# Patient Record
Sex: FEMALE | Race: WHITE
[De-identification: names, ages, dates, MRNs, and addresses within clinical notes are randomized per-mention and may not be internally consistent; named-entity substitution may affect disease eponyms.]

---

## 2019-11-07 ENCOUNTER — HOSPITAL ENCOUNTER (OUTPATIENT)
Dept: HOSPITAL 47 - RADUSWWP | Age: 54
Discharge: HOME | End: 2019-11-07
Attending: OBSTETRICS & GYNECOLOGY
Payer: COMMERCIAL

## 2019-11-07 DIAGNOSIS — D25.9: Primary | ICD-10-CM

## 2019-11-07 PROCEDURE — 76856 US EXAM PELVIC COMPLETE: CPT

## 2019-11-07 NOTE — US
EXAMINATION TYPE: US pelvic complete

 

DATE OF EXAM: 11/7/2019

 

COMPARISON: NONE

 

CLINICAL HISTORY: N95.0 post menopausal bleeding. Pt states episode of moderate vaginal bleeding x 1 
day, denies pain, pt not on HRT's

 

TECHNIQUE:  Transabdominal (TA).  Transabdominal sonographic images of the pelvis were acquired.  

 

Date of LMP:  age 49

 

EXAM MEASUREMENTS:

 

Uterus:  13.1 x 5.2 x 6.2 cm

Endometrial Stripe: 0.6 cm

Right Ovary:  2.0 x 1.2 x 2.1 cm

Left Ovary:  2.0 x 1.3 x 1.6 cm

 

 

 

1. Uterus:  Anteverted   Heterogeneous, enlarged, 3 calcified masses within fundus 1)= 2.6 x 2.0 x 2.
1 cm  2)= 2.1 x 1.8 x 1.9 cm  3)= 1.4 x 1.4 x 1.4 cm

2. Endometrium:  Appeared wnl

3. Right Ovary:  wnl

4. Left Ovary:  wnl

5. Bilateral Adnexa:  wnl

6. Posterior cul-de-sac:  wnl

 

 

 

IMPRESSION: There are small calcified fibroids scattered throughout the uterus. Endometrium not well 
visualized presumed not thickened. Suboptimal study without transvaginal investigation.

## 2020-01-14 ENCOUNTER — HOSPITAL ENCOUNTER (OUTPATIENT)
Dept: HOSPITAL 47 - BARWHC3 | Age: 55
End: 2020-01-14
Attending: SURGERY
Payer: COMMERCIAL

## 2020-01-14 VITALS — BODY MASS INDEX: 29 KG/M2

## 2020-01-14 VITALS
RESPIRATION RATE: 16 BRPM | SYSTOLIC BLOOD PRESSURE: 135 MMHG | TEMPERATURE: 98.7 F | HEART RATE: 92 BPM | DIASTOLIC BLOOD PRESSURE: 81 MMHG

## 2020-01-14 DIAGNOSIS — E55.9: ICD-10-CM

## 2020-01-14 DIAGNOSIS — E66.01: ICD-10-CM

## 2020-01-14 DIAGNOSIS — Z79.899: ICD-10-CM

## 2020-01-14 DIAGNOSIS — K21.9: Primary | ICD-10-CM

## 2020-01-14 DIAGNOSIS — K90.89: ICD-10-CM

## 2020-01-14 DIAGNOSIS — Z98.84: ICD-10-CM

## 2020-01-14 LAB
ERYTHROCYTE [DISTWIDTH] IN BLOOD BY AUTOMATED COUNT: 4.07 M/UL (ref 3.8–5.4)
ERYTHROCYTE [DISTWIDTH] IN BLOOD: 13.7 % (ref 11.5–15.5)
HCT VFR BLD AUTO: 34.8 % (ref 34–46)
HGB BLD-MCNC: 10.9 GM/DL (ref 11.4–16)
MCH RBC QN AUTO: 26.7 PG (ref 25–35)
MCHC RBC AUTO-ENTMCNC: 31.2 G/DL (ref 31–37)
MCV RBC AUTO: 85.6 FL (ref 80–100)
PLATELET # BLD AUTO: 425 K/UL (ref 150–450)
WBC # BLD AUTO: 10.1 K/UL (ref 3.8–10.6)

## 2020-01-14 PROCEDURE — 84425 ASSAY OF VITAMIN B-1: CPT

## 2020-01-14 PROCEDURE — 99211 OFF/OP EST MAY X REQ PHY/QHP: CPT

## 2020-01-14 PROCEDURE — 80053 COMPREHEN METABOLIC PANEL: CPT

## 2020-01-14 PROCEDURE — 82746 ASSAY OF FOLIC ACID SERUM: CPT

## 2020-01-14 PROCEDURE — 82607 VITAMIN B-12: CPT

## 2020-01-14 PROCEDURE — 82306 VITAMIN D 25 HYDROXY: CPT

## 2020-01-14 PROCEDURE — 85027 COMPLETE CBC AUTOMATED: CPT

## 2020-01-14 PROCEDURE — 36415 COLL VENOUS BLD VENIPUNCTURE: CPT

## 2020-01-14 PROCEDURE — 83540 ASSAY OF IRON: CPT

## 2020-01-14 NOTE — P.BASOAP
Subjective


Progress Note Date: 01/14/20


Principal diagnosis: 





Obesity





54-year-old female had a lap band switched to a sleeve gastrectomy around 2010. 

Has done fairly well.  Weight is been in the 159 range for a while.  Lately he 

has had some upper abdominal discomfort along with acid reflux and dysphagia.  

Occasional episodes of vomiting as well.  Lately has taken some Tagamet with 

some relief.  Symptoms for the last few months.  She has not followed up here in

quite some time.  Denies melena or rectal bleeding.





Objective





- Vital Signs


Vital signs: 


                                   Vital Signs











Temp  98.7 F   01/14/20 14:58


 


Pulse  92   01/14/20 14:58


 


Resp  16   01/14/20 14:58


 


BP  135/81   01/14/20 14:58


 


Pulse Ox      








                                 Intake & Output











 01/13/20 01/14/20 01/14/20





 18:59 06:59 18:59


 


Weight   72.121 kg














- Exam








Physical exam:


General: Well-developed, well-nourished


HEENT: Normocephalic, sclerae nonicteric


Abdomen: Nontender, nondistended


Extremities: No edema


Neuro: Alert and oriented





Assessment/Plan


(1) GERD (gastroesophageal reflux disease)


Narrative/Plan: 


Check annual labs at this time.  Begin antiacid therapy.  We'll schedule for EGD

with possible biopsy and dilation.





Plan: 


Date: 01/14/20





Initial Weight: 96.162 kg





Initial BMI: 38.7





Current Weight: 72.121 kg





Current BMI: 29.0





Type of Surgery: 





Total Volume in Band: 





Previous Volume: 





Volume Removed: 





Volume Added: 





Band Size:

## 2020-01-15 LAB
ALBUMIN SERPL-MCNC: 4.5 G/DL (ref 3.8–4.9)
ALBUMIN/GLOB SERPL: 2.37 G/DL (ref 1.6–3.17)
ALP SERPL-CCNC: 90 U/L (ref 41–126)
ALT SERPL-CCNC: 14 U/L (ref 8–44)
ANION GAP SERPL CALC-SCNC: 8.1 MMOL/L (ref 4–12)
AST SERPL-CCNC: 18 U/L (ref 13–35)
BUN SERPL-SCNC: 13 MG/DL (ref 9–27)
BUN/CREAT SERPL: 16.25 RATIO (ref 12–20)
CALCIUM SPEC-MCNC: 9.8 MG/DL (ref 8.7–10.3)
CHLORIDE SERPL-SCNC: 107 MMOL/L (ref 96–109)
CO2 SERPL-SCNC: 26.9 MMOL/L (ref 21.6–31.8)
GLOBULIN SER CALC-MCNC: 1.9 G/DL (ref 1.6–3.3)
GLUCOSE SERPL-MCNC: 96 MG/DL (ref 70–110)
IRON SERPL-MCNC: 17 UG/DL (ref 50–170)
POTASSIUM SERPL-SCNC: 4.5 MMOL/L (ref 3.5–5.5)
PROT SERPL-MCNC: 6.4 G/DL (ref 6.2–8.2)
SODIUM SERPL-SCNC: 142 MMOL/L (ref 135–145)
VIT B12 SERPL-MCNC: 3199 PG/ML (ref 200–944)

## 2020-02-21 ENCOUNTER — HOSPITAL ENCOUNTER (OUTPATIENT)
Dept: HOSPITAL 47 - ORWHC2ENDO | Age: 55
Discharge: HOME | End: 2020-02-21
Attending: SURGERY
Payer: COMMERCIAL

## 2020-02-21 VITALS — SYSTOLIC BLOOD PRESSURE: 118 MMHG | HEART RATE: 74 BPM | DIASTOLIC BLOOD PRESSURE: 80 MMHG | RESPIRATION RATE: 20 BRPM

## 2020-02-21 VITALS — BODY MASS INDEX: 29.2 KG/M2

## 2020-02-21 VITALS — TEMPERATURE: 98.2 F

## 2020-02-21 DIAGNOSIS — K22.10: ICD-10-CM

## 2020-02-21 DIAGNOSIS — Z87.898: ICD-10-CM

## 2020-02-21 DIAGNOSIS — I10: ICD-10-CM

## 2020-02-21 DIAGNOSIS — Z91.018: ICD-10-CM

## 2020-02-21 DIAGNOSIS — Z79.891: ICD-10-CM

## 2020-02-21 DIAGNOSIS — F40.240: ICD-10-CM

## 2020-02-21 DIAGNOSIS — G47.33: ICD-10-CM

## 2020-02-21 DIAGNOSIS — Z80.0: ICD-10-CM

## 2020-02-21 DIAGNOSIS — Z88.6: ICD-10-CM

## 2020-02-21 DIAGNOSIS — K76.0: ICD-10-CM

## 2020-02-21 DIAGNOSIS — Z91.041: ICD-10-CM

## 2020-02-21 DIAGNOSIS — I44.7: ICD-10-CM

## 2020-02-21 DIAGNOSIS — Z98.890: ICD-10-CM

## 2020-02-21 DIAGNOSIS — Z79.84: ICD-10-CM

## 2020-02-21 DIAGNOSIS — Z96.651: ICD-10-CM

## 2020-02-21 DIAGNOSIS — Z88.8: ICD-10-CM

## 2020-02-21 DIAGNOSIS — Z87.19: ICD-10-CM

## 2020-02-21 DIAGNOSIS — K29.50: ICD-10-CM

## 2020-02-21 DIAGNOSIS — G43.909: ICD-10-CM

## 2020-02-21 DIAGNOSIS — Z82.49: ICD-10-CM

## 2020-02-21 DIAGNOSIS — Z81.8: ICD-10-CM

## 2020-02-21 DIAGNOSIS — K21.0: Primary | ICD-10-CM

## 2020-02-21 DIAGNOSIS — Z79.899: ICD-10-CM

## 2020-02-21 DIAGNOSIS — Z90.710: ICD-10-CM

## 2020-02-21 DIAGNOSIS — K31.89: ICD-10-CM

## 2020-02-21 DIAGNOSIS — Z86.73: ICD-10-CM

## 2020-02-21 DIAGNOSIS — M79.7: ICD-10-CM

## 2020-02-21 DIAGNOSIS — Z87.891: ICD-10-CM

## 2020-02-21 DIAGNOSIS — Z88.2: ICD-10-CM

## 2020-02-21 DIAGNOSIS — Z98.84: ICD-10-CM

## 2020-02-21 DIAGNOSIS — E11.9: ICD-10-CM

## 2020-02-21 DIAGNOSIS — Z98.51: ICD-10-CM

## 2020-02-21 PROCEDURE — 88305 TISSUE EXAM BY PATHOLOGIST: CPT

## 2020-02-21 PROCEDURE — 43239 EGD BIOPSY SINGLE/MULTIPLE: CPT

## 2020-02-21 NOTE — P.GSHP
History of Present Illness


H&P Date: 20


Chief Complaint: GERD





54-year-old female known drop his.  Patient with history of lap band in the 

past.  She had a conversion to a sleeve gastrectomy.  Here today for upper 

endoscopy given worsening symptoms of GERD.  Mild dysphagia at times.  Recently 

switched to omeprazole with better symptom control.





Past Medical History


Past Medical History: GERD/Reflux


Additional Past Medical History / Comment(s): hiatal hernia,


History of Any Multi-Drug Resistant Organisms: None Reported


Past Surgical History: Bariatric Surgery,  Section, Joint Replacement, 

Orthopedic Surgery, Tubal Ligation


Additional Past Surgical History / Comment(s): lap band /later removed, gastric 

sleeve,  rt knee replacement, left knee arthroscopy x 2, rt shoulder 

arthroscopy, left elbow surgery for bone chip,


Past Anesthesia/Blood Transfusion Reactions: No Reported Reaction


Additional Past Anesthesia/Blood Transfusion Reaction / Comment(s): No blood 

transfusion to date


Smoking Status: Former smoker





- Past Family History


  ** Father


Family Medical History: Cancer, Pulmonary Embolus


Additional Family Medical History / Comment(s): throat cancer





  ** Mother


Family Medical History: Dementia


Additional Family Medical History / Comment(s): 2019: patient's mother is 81 

years old.





Medications and Allergies


                                Home Medications











 Medication  Instructions  Recorded  Confirmed  Type


 


ARIPiprazole [Abilify] 5 mg PO DAILY 10/23/14 02/21/20 History


 


Cholecalciferol (Vitamin D3) 125 mcg PO DAILY 20 History





[Vitamin D3]    


 


Sertraline [Zoloft] 100 mg PO DAILY 20 History








                                    Allergies











Allergy/AdvReac Type Severity Reaction Status Date / Time


 


No Known Allergies Allergy   Verified 20 08:02














Surgical - Exam


                                   Vital Signs











Temp Pulse Resp BP Pulse Ox


 


 98.2 F   72   16   121/80   97 


 


 20 07:56  20 07:56  20 07:56  20 07:56  20 07:56

















Physical exam:


General: Well-developed, well-nourished


HEENT: Normocephalic, sclerae nonicteric


Abdomen: Nontender, nondistended


Extremities: No edema


Neuro: Alert and oriented





Assessment and Plan


(1) GERD (gastroesophageal reflux disease)


Narrative/Plan: 


Will proceed with upper endoscopy at this time


Current Visit: No   Status: Acute   Code(s): K21.9 - GASTRO-ESOPHAGEAL REFLUX 

DISEASE WITHOUT ESOPHAGITIS   SNOMED Code(s): 299183614

## 2020-02-21 NOTE — P.PCN
Date of Procedure: 02/21/20


Procedure(s) Performed: 


Preoperative Dx: GERD, post sleeve


Postoperative Dx: Mild gastritis, distal esophagitis


Procedure: EGD with Bx


Anesthesia: Sedation


Endoscopist: Dr. Felix


Specimens: Antrum, esophagitis


Endoscopic Procedure:   The patient was on the endoscopy table in the left 

decubitus position.  The Olympus gastroscope was inserted into the oropharynx 

and passed under direct visualization to the region of the third portion of the 

duodenum.  From that point the scope was slowly withdrawn inspecting all 

surfaces carefully.  There were no neoplastic inflammatory or polypoid lesions 

throughout the duodenum.  The pylorus was widely patent.  The stomach was 

carefully inspected.  There was evidence of previous sleeve gastrectomy.  Very 

subtle narrowing during the middle aspect of the sleeve was noted without any 

obstruction to the passage of the scope noted.  The proximal stomach appeared 

normal.  There was no definite hiatal hernia although retroflexion could not 

take place.  At the distal esophagus multiple short linear erosions were 

identified these were non-circumferential and less than 1 cm in length.  A 

biopsy one of these took place.  The remainder the esophagus appeared normal.   

The patient was then taken to the recovery room in stable condition per 

anesthesia guidelines.


Recommendations: Await biopsy results.  Continue antiacid therapy.  Add 

Carafate.  Follow-up bariatric center.

## 2020-06-16 ENCOUNTER — HOSPITAL ENCOUNTER (OUTPATIENT)
Dept: HOSPITAL 47 - RADXRYALE | Age: 55
Discharge: HOME | End: 2020-06-16
Attending: PHYSICIAN ASSISTANT
Payer: COMMERCIAL

## 2020-06-16 DIAGNOSIS — M54.41: Primary | ICD-10-CM

## 2020-06-16 PROCEDURE — 72110 X-RAY EXAM L-2 SPINE 4/>VWS: CPT

## 2020-06-16 NOTE — XR
EXAMINATION TYPE: XR lumbosacral spine min 4V

 

DATE OF EXAM: 6/16/2020

 

CLINICAL HISTORY: pain

 

COMPARISON: NONE

 

TECHNIQUE: Frontal, lateral, and oblique images of the lumbar spine are obtained.

 

FINDINGS:  There are 5 lumbar type vertebral bodies identified.  The lumbar spine shows satisfactory 
alignment without evidence of acute fracture or dislocation. Vertebral body heights are within normal
 limits. Moderate degenerative disc space narrowing at L4-5 and L5-S1. Grade 1 retrolisthesis of L3 a
nd L4 of 5.9 mm. Scattered ventral spondylosis.   The overlying soft tissue appears unremarkable.

 

IMPRESSION:  No acute fracture or dislocation is seen in the lumbar spine.

 

ICD 10 NO FRACTURE, INITIAL EVALUATION

## 2020-12-17 ENCOUNTER — HOSPITAL ENCOUNTER (OUTPATIENT)
Dept: HOSPITAL 47 - RADUSWWP | Age: 55
Discharge: HOME | End: 2020-12-17
Attending: OBSTETRICS & GYNECOLOGY
Payer: COMMERCIAL

## 2020-12-17 DIAGNOSIS — D25.9: Primary | ICD-10-CM

## 2020-12-17 PROCEDURE — 76856 US EXAM PELVIC COMPLETE: CPT

## 2020-12-17 NOTE — US
EXAMINATION TYPE: US pelvic complete

 

DATE OF EXAM: 2020

 

COMPARISON: Pelvic ultrasound 2019.

 

CLINICAL HISTORY: D25.9 KNOWN UTERINE. Follow up fibroids.  Patient states she does not want the lambert
svaginal exam.  

 

TECHNIQUE:  Transabdominal (TA).  Transabdominal sonographic images of the pelvis were acquired.  

 

Date of LMP:  , 

 

EXAM MEASUREMENTS:

 

Uterus:  12.9 x 6.1 x 4.6 cm

Endometrial Stripe: 0.5 cm

Right Ovary:  2.2 x 1.5 x 1.1 cm cm

 

 

 

1. Uterus:  Anteverted   Enlarged.  Heterogenous.  Focal lesions with calcifications.  1- Right margoth
l =  2.1 x 2.2 x 1.7 cm.  2- Mid anterior = 2.1 x 2.2 x 1.7 cm.  3- anterior left = 1.9 x 2.0 x 1.2 c
m.  

2. Endometrium:  wnl

3. Right Ovary:  wnl

4. Left Ovary:  Obscured by overlying bowel gas

5. Bilateral Adnexa:  wnl

6. Posterior cul-de-sac:  no free fluid

 

Persistent heterogeneous anteverted enlarged lobulated fibroid uterus some fibroids are calcified. Fe
w are noted as detailed above. Endometrium not well seen, not suspiciously thickened. No free fluid. 


 

Right ovary small in size system with patient's postmenopausal age. Left ovary not clearly identified
.

 

IMPRESSION: Prominent lobulated fibroid uterus redemonstrated. No significant interval change.

## 2021-04-23 ENCOUNTER — HOSPITAL ENCOUNTER (OUTPATIENT)
Dept: HOSPITAL 47 - RADMAMWWP | Age: 56
Discharge: HOME | End: 2021-04-23
Attending: OBSTETRICS & GYNECOLOGY
Payer: COMMERCIAL

## 2021-04-23 DIAGNOSIS — Z78.0: ICD-10-CM

## 2021-04-23 DIAGNOSIS — M85.89: ICD-10-CM

## 2021-04-23 DIAGNOSIS — Z12.31: Primary | ICD-10-CM

## 2021-04-23 PROCEDURE — 77067 SCR MAMMO BI INCL CAD: CPT

## 2021-04-23 PROCEDURE — 77080 DXA BONE DENSITY AXIAL: CPT

## 2021-04-23 NOTE — BD
EXAMINATION TYPE: Axial Bone Density

 

DATE OF EXAM: 4/23/2021

 

COMPARISON: NONE

 

CLINICAL HISTORY: 55 YR OLD FEMALE....ICD-10 CODE:   N95.1 POST MENOPAUSAL

 

Height:  61.4

Weight:  163

 

FRAX RISK QUESTIONS:

Current Tobacco Use: YES

 

RISK FACTORS 

HISTORY OF: 

Family History of Osteoporosis: YES POSSIBLY MOTHER, NO HIP FX

Diet low in dairy products/other sources of calcium:  YES

Postmenopausal woman: YES, AT AGE 49 YRS OLD

Hyperparathyroidism: NO

Adrenal Insufficiency: NO

 

MEDICATIONS: 

Additional Medications: ZOLOFT ANC ABILIFY, VIT D  

Additional History: NOTHING TO NOTE HERE

 

EXAM MEASUREMENTS: 

Bone mineral densitometry was performed using the [employer] System.

Bone mineral density as measured about the Lumbar spine is:  

----- L1-L4(G/cm2): 1.321

T Score Values are as follows:

----- L1:  1.1

----- L2:  1.8

----- L3:  1.2

----- L4:  0.6

----- L1-L4:  1.2

Bone mineral density      FIRST BONE DENSITY SCAN.......BASELINE STUDY

 

Bone mineral density about the R hip (g/cm2): 0.909

Bone mineral density about the L hip (g/cm2):  0.895

T Score values are as follows:

-----R Neck: -1.2

-----L Neck:  -1.3

-----R Total:  -0.8

-----L Total:  -0.9

Bone mineral density                 BASELINE STUDY

 

FRAX%s:   THERE IS A 6.6% CHANCE FOR A MAJOR OSTEOPOROTIC FX AND A 0.8% FOR HIP.......PROBABILITY FOR
 FX IN 10 YRS TIME

 

IMPRESSION:

Osteopenia (T Score between -2.5 and -1).

 

There is slightly increased risk of fracture and the patient may be considered 

for treatment. 

 

Re-Screen 2-5 years.

 

NOTE:  T-SCORE=SD OF THE YOUNG ADULT MEAN.

## 2021-04-26 NOTE — MM
Reason for exam: screening  (asymptomatic).

Last mammogram was performed 6 years and 10 months ago.



History:

Patient is postmenopausal.



Physical Findings:

A clinical breast exam by your physician is recommended on an annual basis and 

results should be correlated with mammographic findings.



MG Screening Mammo w CAD

Bilateral CC and MLO view(s) were taken.

Prior study comparison: June 24, 2014, bilateral MG screening mammo w CAD.

The breast tissue is heterogeneously dense. This may lower the sensitivity of 

mammography.  There are benign appearing round calcifications bilaterally. There 

is no discrete abnormality.





ASSESSMENT: Benign, BI-RAD 2



RECOMMENDATION:

Routine screening mammogram of both breasts in 1 year.

## 2021-06-09 ENCOUNTER — HOSPITAL ENCOUNTER (OUTPATIENT)
Dept: HOSPITAL 47 - LABPAT | Age: 56
Discharge: HOME | End: 2021-06-09
Attending: ORTHOPAEDIC SURGERY
Payer: COMMERCIAL

## 2021-06-09 DIAGNOSIS — Z01.812: Primary | ICD-10-CM

## 2021-06-09 DIAGNOSIS — M17.12: ICD-10-CM

## 2021-06-09 LAB
ALBUMIN SERPL-MCNC: 4.3 G/DL (ref 3.5–5)
ALP SERPL-CCNC: 85 U/L (ref 38–126)
ALT SERPL-CCNC: 19 U/L (ref 4–34)
ANION GAP SERPL CALC-SCNC: 8 MMOL/L
APTT BLD: 21.8 SEC (ref 22–30)
AST SERPL-CCNC: 31 U/L (ref 14–36)
BUN SERPL-SCNC: 11 MG/DL (ref 7–17)
CALCIUM SPEC-MCNC: 9.7 MG/DL (ref 8.4–10.2)
CHLORIDE SERPL-SCNC: 107 MMOL/L (ref 98–107)
CO2 SERPL-SCNC: 26 MMOL/L (ref 22–30)
ERYTHROCYTE [DISTWIDTH] IN BLOOD BY AUTOMATED COUNT: 4.79 M/UL (ref 3.8–5.4)
ERYTHROCYTE [DISTWIDTH] IN BLOOD: 17 % (ref 11.5–15.5)
GLUCOSE SERPL-MCNC: 88 MG/DL (ref 74–99)
HCT VFR BLD AUTO: 40.5 % (ref 34–46)
HGB BLD-MCNC: 12.8 GM/DL (ref 11.4–16)
INR PPP: 1 (ref ?–1.2)
MCH RBC QN AUTO: 26.7 PG (ref 25–35)
MCHC RBC AUTO-ENTMCNC: 31.7 G/DL (ref 31–37)
MCV RBC AUTO: 84.4 FL (ref 80–100)
PH UR: 5.5 [PH] (ref 5–8)
PLATELET # BLD AUTO: 300 K/UL (ref 150–450)
POTASSIUM SERPL-SCNC: 4.3 MMOL/L (ref 3.5–5.1)
PROT SERPL-MCNC: 6.7 G/DL (ref 6.3–8.2)
PT BLD: 10.5 SEC (ref 9–12)
RBC UR QL: <1 /HPF (ref 0–5)
SODIUM SERPL-SCNC: 141 MMOL/L (ref 137–145)
SP GR UR: 1.01 (ref 1–1.03)
SQUAMOUS UR QL AUTO: <1 /HPF (ref 0–4)
UROBILINOGEN UR QL STRIP: <2 MG/DL (ref ?–2)
WBC # BLD AUTO: 8.7 K/UL (ref 3.8–10.6)
WBC #/AREA URNS HPF: <1 /HPF (ref 0–5)

## 2021-06-09 PROCEDURE — 80053 COMPREHEN METABOLIC PANEL: CPT

## 2021-06-09 PROCEDURE — 85610 PROTHROMBIN TIME: CPT

## 2021-06-09 PROCEDURE — 87070 CULTURE OTHR SPECIMN AEROBIC: CPT

## 2021-06-09 PROCEDURE — 36415 COLL VENOUS BLD VENIPUNCTURE: CPT

## 2021-06-09 PROCEDURE — 85027 COMPLETE CBC AUTOMATED: CPT

## 2021-06-09 PROCEDURE — 81001 URINALYSIS AUTO W/SCOPE: CPT

## 2021-06-09 PROCEDURE — 85730 THROMBOPLASTIN TIME PARTIAL: CPT

## 2021-06-14 ENCOUNTER — HOSPITAL ENCOUNTER (OUTPATIENT)
Dept: HOSPITAL 47 - OR | Age: 56
LOS: 1 days | Discharge: HOME HEALTH | End: 2021-06-15
Attending: ORTHOPAEDIC SURGERY
Payer: COMMERCIAL

## 2021-06-14 VITALS — BODY MASS INDEX: 29.2 KG/M2

## 2021-06-14 DIAGNOSIS — Z79.82: ICD-10-CM

## 2021-06-14 DIAGNOSIS — F17.210: ICD-10-CM

## 2021-06-14 DIAGNOSIS — D64.9: ICD-10-CM

## 2021-06-14 DIAGNOSIS — F32.9: ICD-10-CM

## 2021-06-14 DIAGNOSIS — Z96.651: ICD-10-CM

## 2021-06-14 DIAGNOSIS — Z98.51: ICD-10-CM

## 2021-06-14 DIAGNOSIS — Z80.0: ICD-10-CM

## 2021-06-14 DIAGNOSIS — Z98.890: ICD-10-CM

## 2021-06-14 DIAGNOSIS — Z82.49: ICD-10-CM

## 2021-06-14 DIAGNOSIS — Z83.3: ICD-10-CM

## 2021-06-14 DIAGNOSIS — Z79.1: ICD-10-CM

## 2021-06-14 DIAGNOSIS — Z97.3: ICD-10-CM

## 2021-06-14 DIAGNOSIS — M17.12: Primary | ICD-10-CM

## 2021-06-14 DIAGNOSIS — Z79.899: ICD-10-CM

## 2021-06-14 DIAGNOSIS — Z81.8: ICD-10-CM

## 2021-06-14 DIAGNOSIS — K21.9: ICD-10-CM

## 2021-06-14 PROCEDURE — 80048 BASIC METABOLIC PNL TOTAL CA: CPT

## 2021-06-14 PROCEDURE — 73560 X-RAY EXAM OF KNEE 1 OR 2: CPT

## 2021-06-14 PROCEDURE — 88300 SURGICAL PATH GROSS: CPT

## 2021-06-14 PROCEDURE — 97161 PT EVAL LOW COMPLEX 20 MIN: CPT

## 2021-06-14 PROCEDURE — 64999 UNLISTED PX NERVOUS SYSTEM: CPT

## 2021-06-14 PROCEDURE — 85025 COMPLETE CBC W/AUTO DIFF WBC: CPT

## 2021-06-14 PROCEDURE — 27447 TOTAL KNEE ARTHROPLASTY: CPT

## 2021-06-14 PROCEDURE — 76942 ECHO GUIDE FOR BIOPSY: CPT

## 2021-06-14 PROCEDURE — 64448 NJX AA&/STRD FEM NRV NFS IMG: CPT

## 2021-06-14 RX ADMIN — HYDROMORPHONE HYDROCHLORIDE PRN MG: 1 INJECTION, SOLUTION INTRAMUSCULAR; INTRAVENOUS; SUBCUTANEOUS at 16:30

## 2021-06-14 RX ADMIN — POTASSIUM CHLORIDE SCH MLS: 14.9 INJECTION, SOLUTION INTRAVENOUS at 12:35

## 2021-06-14 RX ADMIN — ASPIRIN 81 MG CHEWABLE TABLET SCH MG: 81 TABLET CHEWABLE at 20:12

## 2021-06-14 RX ADMIN — POTASSIUM CHLORIDE SCH MLS: 14.9 INJECTION, SOLUTION INTRAVENOUS at 11:30

## 2021-06-14 RX ADMIN — HYDROCODONE BITARTRATE AND ACETAMINOPHEN PRN EACH: 7.5; 325 TABLET ORAL at 20:12

## 2021-06-14 RX ADMIN — SUCRALFATE SCH: 1 TABLET ORAL at 20:06

## 2021-06-14 RX ADMIN — DEXAMETHASONE SODIUM PHOSPHATE ONE MG: 4 INJECTION, SOLUTION INTRAMUSCULAR; INTRAVENOUS at 11:54

## 2021-06-14 RX ADMIN — HYDROMORPHONE HYDROCHLORIDE PRN MG: 1 INJECTION, SOLUTION INTRAMUSCULAR; INTRAVENOUS; SUBCUTANEOUS at 15:06

## 2021-06-14 RX ADMIN — DEXAMETHASONE SODIUM PHOSPHATE ONE: 4 INJECTION, SOLUTION INTRAMUSCULAR; INTRAVENOUS at 17:56

## 2021-06-14 RX ADMIN — POTASSIUM CHLORIDE SCH: 14.9 INJECTION, SOLUTION INTRAVENOUS at 17:56

## 2021-06-14 NOTE — XR
EXAMINATION TYPE: XR knee limited LT

 

DATE OF EXAM: 6/14/2021

 

CLINICAL HISTORY: Left knee pain and arthritis status post total knee replacement.

 

TECHNIQUE:  Portable AP and crosstable lateral views of the left knee are obtained immediately postop
eratively.

 

COMPARISON: None

 

FINDINGS:  Metallic hardware from total left knee arthroplasty is seen and appears satisfactory in al
ignment and position.  There is evidence of recent surgery with diffuse subcutaneous gas and joint ef
fusion noted. 

 

 

IMPRESSION:  METALLIC HARDWARE FROM TOTAL left KNEE ARTHROPLASTY IS SATISFACTORY IN ALIGNMENT.

## 2021-06-14 NOTE — P.CON
Consult Note





- .


Consult date: 21


Assessment/Plan:: 





Reason for consult- management of chronic medical conditions





Ms. Lawrence is a 56-year-old female with a past medical history of GERD, 

osteoarthritis coming in for left knee arthroplasty.  Patient had the procedure 

done this afternoon by Dr. Mcneal.  She is lying comfortably in the bed appears 

to be no acute distress.  Patient states that she has GERD and she takes 

sucralfate on a regular basis.  Patient denies having any abdominal pain nausea 

vomiting or diarrhea.  She is an active smoker.  She denies having any fevers 

chills or rigors.  No cough or difficulty in breathing.  No lower extremity 

swelling.





 Review of Systems 





CONSTITUTIONAL: No fever, no malaise, no fatigue. 


HEENT:  History of vocal cord paralysis status post cervical surgery


CARDIOVASCULAR: No chest pain or palpitations  


PULMONARY: No shortness of breath, no cough, no hemoptysis. 


GASTROINTESTINAL: No abdominal pain nausea vomiting or diarrhea 


NEUROLOGICAL: No headaches, no weakness, no numbness. 


HEMATOLOGICAL: Denies any bleeding or petechiae. 


GENITOURINARY: Denies any burning micturition, frequency, or urgency. 


MUSCULOSKELETAL/RHEUMATOLOGICAL: Denies any joint pain, swelling, or any muscle 

pain. 


ENDOCRINE: Denies any polyuria or polydipsia.


PSYC: No depression or anxiety





All 13 review of systems negative except for the ones mentioned above





 Past Medical History 


Past Medical History: GERD/Reflux, Osteoarthritis (OA)


Additional Past Medical History / Comment(s): hiatal hernia,


History of Any Multi-Drug Resistant Organisms: None Reported


Past Surgical History: Bariatric Surgery,  Section, Hernia Repair, Joint

Replacement, Orthopedic Surgery, Tubal Ligation


Additional Past Surgical History / Comment(s): lap band /later removed, gastric 

sleeve,  rt knee replacement, left knee arthroscopy x 2, rt shoulder 

arthroscopy, left elbow surgery for bone chip, HIATAL HERNIA REPAIR


Past Anesthesia/Blood Transfusion Reactions: No Reported Reaction


Additional Past Anesthesia/Blood Transfusion Reaction / Comment(s): No blood 

transfusion to date


Past Psychological History: Depression


Additional Psychological History / Comment(s): .


Smoking Status: Former smoker


Past Alcohol Use History: Occasional


Additional Past Alcohol Use History / Comment(s): smokes half a pack


Past Drug Use History: None Reported





- Past Family History


  ** Father


Family Medical History: Cancer, Pulmonary Embolus


Additional Family Medical History / Comment(s): throat cancer





  ** Mother


Family Medical History: Dementia


Additional Family Medical History / Comment(s): 2019: patient's mother is 81 

years old.





 Medications and Allergies 


                                Home Medications











 Medication  Instructions  Recorded  Confirmed  Type


 


ARIPiprazole [Abilify] 5 mg PO DAILY 10/23/14 06/14/21 History


 


Cholecalciferol (Vitamin D3) 125 mcg PO DAILY 20 History





[Vitamin D3]    


 


Sertraline [Zoloft] 100 mg PO DAILY 20 History


 


Sucralfate [Carafate] 1 gm PO ACHS #120 tab 20 Rx


 


Aspirin 81 mg PO BID #60 tab 21  Rx


 


Gabapentin [Neurontin] 300 mg PO HS #5 cap 21  Rx


 


Hydrocodone/Acetaminophen [Norco 1 - 2 tab PO Q4-6H PRN #40 tab 21  Rx





7.5-325]    


 


Meloxicam [Mobic] 7.5 mg PO DAILY #60 tab 21  Rx


 


Ondansetron Odt [Zofran Odt] 4 mg PO Q12HR PRN #10 tab 21  Rx


 


Sennosides-Docusate Sodium 1 tab PO BID #30 tablet 21  Rx





[Senokot-S]    








                                    Allergies











Allergy/AdvReac Type Severity Reaction Status Date / Time


 


No Known Allergies Allergy   Verified 21 11:18














 Physical Exam 


Vitals: 


                                   Vital Signs











  Temp Pulse Pulse Resp BP Pulse Ox


 


 21 17:34  98.3 F  97   18  149/83  94 L


 


 21 16:53    89  16  141/78  92 L


 


 21 16:30    89  14  135/69  95


 


 21 16:00    102 H  16  142/75  92 L


 


 21 15:45    107 H  16  160/76  91 L


 


 21 15:27    106 H  14  160/68  92 L


 


 21 15:12    107 H  16  170/87  92 L


 


 21 15:00    104 H   178/85  94 L


 


 21 14:45    110 H  14  169/80  92 L


 


 21 14:31  97.8 F   112 H  12  169/80  94 L


 


 21 11:07  98.2 F   83  16  155/80  98








                                Intake and Output











 21





 06:59 14:59 22:59


 


Intake Total  1751 300


 


Output Total  100 


 


Balance  1651 300


 


Intake:   


 


  IV  1751 300


 


Output:   


 


  Estimated Blood Loss  100 


 


Other:   


 


  Weight  72.9 kg 72.9 kg





GENERAL: The patient is alert and oriented x3, not in any acute distress.


HEENT: Pupils are round and equally reacting to light. EOMI. No scleral icterus.

no  conjunctival pallor. Normocephalic, atraumatic. 


CARDIOVASCULAR: S1 and S2 present. No murmurs, rubs, or gallops. 


PULMONARY: Chest is clear to auscultation, no wheezing or crackles. 


ABDOMEN: Soft, nontender, nondistended.  Bowel sounds positive


MUSCULOSKELETAL: No joint swelling or deformity. 


EXTREMITIES: No cyanosis, clubbing, or pedal edema.  Left knee covered in 

bandage


NEUROLOGICAL: Gross neurological examination did not reveal any focal deficits.


Psychiatric: Appropriate mood and affect  








ASSESSMENT





Status post left knee arthroplasty


GERD


Active smoker


Multiple joint osteoarthritis





PLAN: Patient is advised to do incentive spirometry.  Continue with sucralfate 

for GERD, to avoid NSAIDs.  Pain management and DVT prophylaxis as per primary 

team.  We will follow the patient on as-needed basis.





Thank you for the consultation.

## 2021-06-14 NOTE — P.ANPRN
Procedure Note - Anesthesia





- Nerve Block Performed


  ** Left Adductor Canal Infusion


Time Out Performed: Yes (1151)


Date of Procedure: 06/14/21


Procedure Start Time: 11:51


Procedure Stop Time: 11:59


Location of Patient: PreOp


Indication: Acute Post-Operative Pain, Dx/Pain Location (Left Knee), Requested 

by Surgeon


Specifically requested for management of pain by DrTod: Jorge Luis Mcneal


Sedation Type: Sedate with meaningful contact maintained


Preparation: Sterile Prep, Sterile Dressing


Position: Supine


Catheter: Indwelling


Needle Types: On-Q


Needle Gauge: 18


Ultrasound used to visualize needle placement: Yes


Ultrasound used to observe medication spread: Yes


Injectate: 0.5% Ropivacaine (see comment for volume) (15 cc)


Blood Aspirated: No


Pain Paresthesia on Injection Noted: No


Resistance on Injection: Normal


Events: Uneventful and Well Tolerated





  ** iPack


Time Out Performed: Yes


Date of Procedure: 06/14/21


Procedure Start Time: 12:00


Procedure Stop Time: 12:05


Location of Patient: PreOp


Indication: Dx/Pain Location (Left knee), Requested by Surgeon


Specifically requested for management of pain by DrTod: Jorge Luis Mcneal


Sedation Type: Sedate with meaningful contact maintained


Preparation: Sterile Prep


Position: Right Lateral


Catheter: None


Needle Types: Pajunk


Needle Gauge: 21


Ultrasound used to visualize needle placement: Yes


Ultrasound used to observe medication spread: Yes


Injectate: 0.5% Ropivacaine (see comment for volume) (15 cc)


Blood Aspirated: No


Pain Paresthesia on Injection Noted: No


Resistance on Injection: Normal


Image Stored and Saved: Yes

## 2021-06-14 NOTE — P.OP
Date of Procedure: 06/14/21


Procedure(s) Performed: 


PREOPERATIVE DIAGNOSIS: Left knee severe osteoarthritis with genu varum





POSTOPERATIVE DIAGNOSIS: Left knee severe osteoarthritis with genu varum





OPERATION: Left knee cemented total replacement arthroplasty.


 


ANESTHESIA: Spinal





ESTIMATED BLOOD LOSS: 100 ml.





ASSISTANT: Breonna Aldana NP (assistance with: patient positioning, retraction, 

exposure, hemostasis, leg positioning, implantation, irrigation, closure, 

dressing)





COMPLICATIONS: None apparent. 





COMPONENTS IMPLANTED: Persona system from [employer]





INDICATIONS:  Mala is a 56 year old female with a history of left knee 

osteoarthritis.  Conservative treatment has been tried and has been unsuccessful

in controlling symptoms adequately.  The operation of knee replacement has been 

discussed at length in the office, as well as potential risks and complications.

 These are inclusive of, but not limited to: bleeding, infection, scarring, 

discomfort, blood vessel and nerve damage, need for further surgery, failure to 

relieve symptoms, persistence, recurrence, or worsening of problems, loosening, 

dislocation, wear, blood clot, pulmonary embolism, death, gait dysfunction, 

stiffness, and other risks as discussed in the office.  The patient elects to 

proceed and the consent form has been signed. 





PROCEDURE: The patient was taken to the operating room and positioned on the 

operating room table in the supine position.  Anesthesia was initiated.  Care 

was taken to make sure that all pressure points were adequately padded. The 

operative lower extremity was prepped and draped in the usual aseptic fashion 

using ChloraPrep.  Ioban drape was used for the case and the patient received 

intravenous antibiotics within one hour of the incision. A pneumotourniquet and 

leg laureano were used for the case. The limb was exsanguinated with an Esmarch 

bandage and the tourniquet was inflated to 300 mmHg. Time-out was called 

confirming the patient's identity, side, procedure and administration of 

antibiotics and tranexamic acid, 1 g IV.





 The incision was then created midline directly over the knee, carried down 

through skin and into the subcutaneous tissues and down to fascia. Full 

thickness subcutaneous medial flap was developed.  Medial parapatellar 

arthrotomy was performed and the interior of the knee was inspected. There was 

end-stage osteoarthritis of the knee with a mild to moderate genu varum type 

deformity. The fat pad was excised and proximal medial release on the tibia was 

completed using meticulous dissection and a curved osteotome. The anterior 

cruciate ligament was taken down.  Note was made of significant attrition of the

anterior and significant degenerative appearance of the posterior cruciate 

ligaments. The exposure was excellent. 





The knee was flexed 90 degrees and the patella was everted. A spot was chosen on

the femur approximately 1 cm anterior to the posterior cruciate ligament 

insertion and an intramedullary hole was created within the femur. The 

intramedullary guide was then set to 5 degrees of valgus. The distal cutting 

block was attached and pinned into position. An appropriate amount of distal 

femoral resection was set. The oscillating saw was then used to make the distal 

femoral cut. This cut was confirmed to be flat with the flat end of an 

osteotome. 





The retractors were placed around the tibia and the tibial surface was 

addressed. The angle and depth of resection was adjusted using an extramedullary

cutting guide. The guide had a built-in 3 degree posterior slope cut.  Once the 

cutting guide was adjusted appropriately and in line with the axis of the tibia 

and confirmed to be in good position in relation to the second metatarsal and 

transmalleolar axis, the tibial cut was then created with protection of the 

posterior neurovascular structures and the collateral ligaments. The tibial cut 

surface was removed and sized.





Femoral sizing was then accomplished using anterior  referencing.  Care was 

taken to analyze the posterior condyles for signs of deficiency or severe wear, 

and adjustments to the guide were made, as appropriate.   3 degree external r

otation pins were placed.  The cutting jig for the femur was applied to these 

pins.  The planned cuts were further analyzed prior to performing them with the 

oscillating saw. No femoral notching was produced. Bone fragments were removed 

and the cut surfaces were finished, as necessary, with a reciprocating saw.





Spacer block technique was then used to confirm that the flexion and extension 

gaps were equal.  Soft tissue releases and adjustment of the tibial and/or 

femoral cuts were made, as necessary, until the gaps were equal. This included 

release of the posterior cruciate ligament, which was tight in this patient.  

The femur was then further finished for a posterior cruciate ligament 

substituting component.

## 2021-06-15 VITALS — HEART RATE: 76 BPM | SYSTOLIC BLOOD PRESSURE: 125 MMHG | DIASTOLIC BLOOD PRESSURE: 80 MMHG | RESPIRATION RATE: 16 BRPM

## 2021-06-15 VITALS — TEMPERATURE: 98.2 F

## 2021-06-15 LAB
ANION GAP SERPL CALC-SCNC: 8.2 MMOL/L (ref 4–12)
BASOPHILS # BLD AUTO: 0.03 X 10*3/UL (ref 0–0.1)
BASOPHILS NFR BLD AUTO: 0.3 %
BUN SERPL-SCNC: 13 MG/DL (ref 9–27)
BUN/CREAT SERPL: 18.57 RATIO (ref 12–20)
CALCIUM SPEC-MCNC: 8.7 MG/DL (ref 8.7–10.3)
CHLORIDE SERPL-SCNC: 107 MMOL/L (ref 96–109)
CO2 SERPL-SCNC: 25.8 MMOL/L (ref 21.6–31.8)
EOSINOPHIL # BLD AUTO: 0.03 X 10*3/UL (ref 0.04–0.35)
EOSINOPHIL NFR BLD AUTO: 0.3 %
ERYTHROCYTE [DISTWIDTH] IN BLOOD BY AUTOMATED COUNT: 3.96 X 10*6/UL (ref 4.1–5.2)
ERYTHROCYTE [DISTWIDTH] IN BLOOD: 17.3 % (ref 11.5–14.5)
GLUCOSE SERPL-MCNC: 122 MG/DL (ref 70–110)
HCT VFR BLD AUTO: 34.3 % (ref 37.2–46.3)
HGB BLD-MCNC: 10.9 G/DL (ref 12–15)
LYMPHOCYTES # SPEC AUTO: 2.18 X 10*3/UL (ref 0.9–5)
LYMPHOCYTES NFR SPEC AUTO: 18.6 %
MCH RBC QN AUTO: 27.5 PG (ref 27–32)
MCHC RBC AUTO-ENTMCNC: 31.8 G/DL (ref 32–37)
MCV RBC AUTO: 86.6 FL (ref 80–97)
MONOCYTES # BLD AUTO: 0.77 X 10*3/UL (ref 0.2–1)
MONOCYTES NFR BLD AUTO: 6.6 %
NEUTROPHILS # BLD AUTO: 8.69 X 10*3/UL (ref 1.8–7.7)
NEUTROPHILS NFR BLD AUTO: 73.8 %
PLATELET # BLD AUTO: 254 X 10*3/UL (ref 140–440)
POTASSIUM SERPL-SCNC: 4.3 MMOL/L (ref 3.5–5.5)
SODIUM SERPL-SCNC: 141 MMOL/L (ref 135–145)
WBC # BLD AUTO: 11.75 X 10*3/UL (ref 4.5–10)

## 2021-06-15 RX ADMIN — ASPIRIN 81 MG CHEWABLE TABLET SCH MG: 81 TABLET CHEWABLE at 08:04

## 2021-06-15 RX ADMIN — SUCRALFATE SCH GM: 1 TABLET ORAL at 08:04

## 2021-06-15 RX ADMIN — POTASSIUM CHLORIDE SCH: 14.9 INJECTION, SOLUTION INTRAVENOUS at 07:58

## 2021-06-15 RX ADMIN — HYDROCODONE BITARTRATE AND ACETAMINOPHEN PRN EACH: 7.5; 325 TABLET ORAL at 08:05

## 2021-06-15 RX ADMIN — HYDROCODONE BITARTRATE AND ACETAMINOPHEN PRN EACH: 7.5; 325 TABLET ORAL at 01:56

## 2021-06-15 RX ADMIN — POTASSIUM CHLORIDE SCH MLS/HR: 14.9 INJECTION, SOLUTION INTRAVENOUS at 03:16

## 2021-06-15 NOTE — P.DS
Providers


Expected date of discharge: 06/15/21


Attending physician: 


Jorge Luis Mcneal





Consults: 





                                        





06/14/21 18:08


Consult Physician Routine 


   Consulting Provider: Renetta Cerrato


   Consult Reason/Comments: MEDICAL MANAGEMENT


   Do you want consulting provider notified?: Yes











Primary care physician: 


Jarred Amaya








- Discharge Diagnosis(es)


(1) Primary osteoarthritis of left knee


Current Visit: Yes   Status: Acute   





(2) Status post left knee replacement


Current Visit: Yes   Status: Acute   


Hospital Course: 





This is a pleasant 56-year-old female last seen in our office with complaints of

left knee pain.  Patient has known history of degenerative arthritis of the left

knee and presented to discuss options.  After discussion and consideration, the 

patient elected to proceed with a left total knee arthroplasty.  Patient was 

seen preoperatively, and medically cleared for surgery by her primary care 

physician. 





Patient was admitted to [facility] underwent left total knee 

arthroplasty on 6/14/2021 with Dr. Mcneal.  The procedure was performed without 

complications or sequelae.  The patient is seen and evaluated at bedside today. 

Pain is well-controlled.  Patient has no new complaints today and denies any 

fevers, chills, nausea, vomiting, or shortness of breath.  Vital signs are 

stable.  Dressing is clean dry and intact.  Incision looks fine with no erythema

or active drainage.  Calf is soft and nontender.  Patient has full foot and 

ankle motion without difficulty.  Patient's left lower extremity is 

neurovascularly intact.  





The patient is orthopedically stable for discharge today.


Patient Condition at Discharge: Stable





Plan - Discharge Summary


Discharge Rx Participant: Yes


New Discharge Prescriptions: 


New


   Aspirin 81 mg PO BID #60 tab


   Sennosides-Docusate Sodium [Senokot-S] 1 tab PO BID #30 tablet


   Meloxicam [Mobic] 7.5 mg PO DAILY #60 tab


   Gabapentin [Neurontin] 300 mg PO HS #5 cap


   Ondansetron Odt [Zofran Odt] 4 mg PO Q12HR PRN #10 tab


     PRN Reason: Nausea


   Hydrocodone/Acetaminophen [Norco 7.5-325] 1 - 2 tab PO Q4-6H PRN #40 tab


     PRN Reason: Pain





No Action


   ARIPiprazole [Abilify] 5 mg PO DAILY


   Sertraline [Zoloft] 100 mg PO DAILY


   Cholecalciferol (Vitamin D3) [Vitamin D3] 125 mcg PO DAILY


   Sucralfate [Carafate] 1 gm PO ACHS #120 tab


Discharge Medication List





ARIPiprazole [Abilify] 5 mg PO DAILY 10/23/14 [History]


Cholecalciferol (Vitamin D3) [Vitamin D3] 125 mcg PO DAILY 02/18/20 [History]


Sertraline [Zoloft] 100 mg PO DAILY 02/18/20 [History]


Sucralfate [Carafate] 1 gm PO ACHS #120 tab 02/21/20 [Rx]


Aspirin 81 mg PO BID #60 tab 06/14/21 [Rx]


Gabapentin [Neurontin] 300 mg PO HS #5 cap 06/14/21 [Rx]


Hydrocodone/Acetaminophen [Norco 7.5-325] 1 - 2 tab PO Q4-6H PRN #40 tab 

06/14/21 [Rx]


Meloxicam [Mobic] 7.5 mg PO DAILY #60 tab 06/14/21 [Rx]


Ondansetron Odt [Zofran Odt] 4 mg PO Q12HR PRN #10 tab 06/14/21 [Rx]


Sennosides-Docusate Sodium [Senokot-S] 1 tab PO BID #30 tablet 06/14/21 [Rx]








Follow up Appointment(s)/Referral(s): 


Jorge Luis Mcneal MD [STAFF PHYSICIAN] - 2 Weeks


Activity/Diet/Wound Care/Special Instructions: 


Weightbearing as tolerated with walker


Keep dressing in place for 10 days unless saturated


Aspirin 81 mg twice a day


May shower over dressing


Follow up with  or Hallie Miller PA-C in 2 weeks.





Call [facility] with questions or concerns, [phone]


Discharge Disposition: HOME WITH HOME HEALTH SERVICES

## 2021-06-15 NOTE — P.PN
Progress Note - Text


Patient was seen at 6:36 AM in the morning.


Postoperative day # 1 status post total knee arthroplasty, on adductor canal 

perineural catheter placed for postoperative analgesia.


Ropivacaine 0.2%  8 mL per hour through ON-Q pump continuous infusion.


Pain is well controlled.  On visual analog scale 2/10


Patient is taking PRN oral pain medications. 


Catheter site: Looks Ok. There is no erythema or tenderness.


Continue with the current pain management plan and will follow.

## 2023-11-28 ENCOUNTER — HOSPITAL ENCOUNTER (OUTPATIENT)
Dept: HOSPITAL 47 - WWCWWP | Age: 58
End: 2023-11-28
Attending: OBSTETRICS & GYNECOLOGY
Payer: COMMERCIAL

## 2023-11-28 VITALS
TEMPERATURE: 98 F | HEART RATE: 79 BPM | SYSTOLIC BLOOD PRESSURE: 123 MMHG | RESPIRATION RATE: 16 BRPM | DIASTOLIC BLOOD PRESSURE: 79 MMHG

## 2023-11-28 DIAGNOSIS — K21.9: ICD-10-CM

## 2023-11-28 DIAGNOSIS — F17.210: ICD-10-CM

## 2023-11-28 DIAGNOSIS — F32.A: ICD-10-CM

## 2023-11-28 DIAGNOSIS — D25.9: ICD-10-CM

## 2023-11-28 DIAGNOSIS — Z78.0: ICD-10-CM

## 2023-11-28 DIAGNOSIS — Z86.018: ICD-10-CM

## 2023-11-28 DIAGNOSIS — Z79.84: ICD-10-CM

## 2023-11-28 DIAGNOSIS — E11.9: ICD-10-CM

## 2023-11-28 DIAGNOSIS — Z96.653: ICD-10-CM

## 2023-11-28 DIAGNOSIS — Z12.31: Primary | ICD-10-CM

## 2023-11-28 DIAGNOSIS — F41.9: ICD-10-CM

## 2023-11-28 DIAGNOSIS — N94.10: ICD-10-CM

## 2023-11-28 DIAGNOSIS — M85.80: ICD-10-CM

## 2023-11-28 PROCEDURE — 77067 SCR MAMMO BI INCL CAD: CPT

## 2023-11-28 NOTE — P.HPOB
History of Present Illness


H&P Date: 23


Chief Complaint: The patient is here for her routine gynecologic exam and ma

mmogram.





This is a 58-year-old  with an LMP of .  The patient is here to 

establish with this office.  She states it has been 4-5 years since her last 

pelvic exam.  She is complaining of painful intercourse even with lubrication 

for the past 1 year.  It is uncomfortable with all penetration and not just with

deep penetration.  It can get slightly better later in the act of intercourse.  

She is otherwise without gynecologic complaints and denies any postmenopausal 

bleeding.





Review of Systems





The patient has lost 15 pounds over the last year.  The weight loss has been 

intentional.  She denies respiratory, cardiac, or G.I. problems.





Past Medical History


Past Medical History: Diabetes Mellitus, GERD/Reflux


Additional Past Medical History / Comment(s): hiatal hernia, osteopenia. PAST 

GYN HISTORY: She has no history of STDs.  History of uterine fibroids.


History of Any Multi-Drug Resistant Organisms: None Reported


Past Surgical History: Bariatric Surgery,  Section, Joint Replacement, 

Orthopedic Surgery, Tubal Ligation


Additional Past Surgical History / Comment(s): lap band /later removed, gastric 

sleeve, bilateral knee replacement, left knee arthroscopy x 2, rt shoulder 

arthroscopy, left elbow surgery for bone chip,  section 3.


Past Anesthesia/Blood Transfusion Reactions: No Reported Reaction


Additional Past Anesthesia/Blood Transfusion Reaction / Comment(s): No blood 

transfusion to date


Past Psychological History: Anxiety, Depression (Under control with 

medications.)


Additional Psychological History / Comment(s): .


Smoking Status: Current every day smoker (About a half a pack of cigars per 

day.)


Past Alcohol Use History: Occasional (2 drinks per week.)


Additional Past Alcohol Use History / Comment(s): smokes half a pack


Past Drug Use History: None Reported


Additional History: She has been  since  and is sexually active.  She

is a  for [employer].





- Past Family History


  ** Father


Family Medical History: Cancer, Diabetes Mellitus, Myocardial Infarction (MI), 

Pulmonary Embolus


Additional Family Medical History / Comment(s): throat cancer.  .





  ** Mother


Family Medical History: Dementia


Additional Family Medical History / Comment(s): .  No family history of 

cancer of the breast, uterus, ovaries, or colon.





Medications and Allergies


                                Home Medications











 Medication  Instructions  Recorded  Confirmed  Type


 


ARIPiprazole [Abilify] 5 mg PO DAILY 10/23/14 11/28/23 History


 


Cholecalciferol (Vitamin D3) 125 mcg PO DAILY 20 History





[Vitamin D3 (5000 Iu)]    


 


Sertraline [Zoloft] 100 mg PO DAILY 20 History


 


metFORMIN  mg PO DAILY 23 History








                                    Allergies











Allergy/AdvReac Type Severity Reaction Status Date / Time


 


No Known Allergies Allergy   Verified 23 10:37














Exam


                                   Vital Signs











  Temp Pulse Resp BP Pulse Ox


 


 23 10:41  98 F  79  16  123/79  99








                                Intake and Output











 23





 22:59 06:59 14:59


 


Other:   


 


  Weight   72.575 kg














Height 5 feet 2 inches, weight 160 pounds, BMI 29.3.





This is a well-developed well-nourished white female who is alert and oriented 

times 3 in no acute distress.


HEENT: Within normal limits.


NECK: Supple without mass or thyromegaly.


CHEST AND LUNGS: Clear to auscultation.


HEART: Regular rate and rhythm.


BREASTS: Are without mass or discharge.


AXILLARY EXAM: Negative for adenopathy.


BACK: Negative for CVA tenderness.


ABDOMEN: Soft, nontender, without palpable masses.


PELVIC EXAM: Normal external genitalia with mild atrophy. Cervix and vagina 

appear normal with mild atrophy.  The vagina is very long and narrow. There is 

no unusual discharge.  There is no evidence of prolapse.  The uterus is 

midposition, nongravid size and nontender. There are no palpable adnexal masses 

or tenderness.


RECTAL EXAM: Rectovaginal exam is negative for mass or tenderness and is 

negative for occult blood.


EXTREMITIES: Nontender.





Pelvic ultrasound was done on 2020 and this showed multiple small uterine 

fibroids with the largest measuring 2.2 cm.





IMPRESSION: 


1.  58-year-old menopausal female with normal gynecologic exam.


2.  Dyspareunia probably secondary to genital atrophy with a small vaginal 

dimensions secondary to no vaginal births.


3.  History of uterine fibroids which are asymptomatic.


4.  History of osteopenia.





PLAN: 


1.  Pap smear cotest was performed.


2.  Self breast awareness was discussed with the patient.  We have also 

discussed symptoms associated with inflammatory breast cancer.


3.  Screening mammogram will be done today.


4.  Osteoporosis prevention was discussed.  I have stressed the importance of 

adequate calcium, vitamin D and regular exercise.  Recommended amounts of 

calcium and vitamin D were also discussed.  Her last bone density test was done 

on 2021.  I recommended repeating this next year.


5.  Trial of Premarin vaginal cream.  She is to insert 1 g into the vagina 2 

times weekly.  A sample tube was given to the patient with instructions.  The 

electronic prescription will be sent to [facility] in [place].


6.  She is scheduled for a colonoscopy in 2024.  She will arrange these 

things through her PCP.


7. She was advised to return in one year for her annual well woman exam and as 

needed.

## 2023-11-29 NOTE — MM
Reason for Exam: Screening  (asymptomatic). 

Last mammogram was performed 2 year(s) and 7 month(s) ago. 





Patient History: 

Menarche at age 10. First Full-Term Pregnancy at age 25. Postmenopausal. 





Risk Values: 

Julieta 5 year model risk: 1.7%.

NCI Lifetime model risk: 9.1%.





Prior Study Comparison: 

4/25/2007 Bilateral Screening Mammogram, [facility]. 6/24/2014 Bilateral Screening Mammogram, [facility].

4/23/2021 Bilateral Screening Mammogram, [facility]. 





Tissue Density: 

There are scattered fibroglandular densities.





Findings: 

Analyzed By CAD. 

There is no suspicious group of microcalcifications or new suspicious mass. 





Overall Assessment: Negative, BI-RAD 1





Management: 

Screening Mammogram of both breasts in 1 year.

[facility] will attempt to contact patient to return for supplemental views and

ultrasound if indicated.



Patient should continue monthly self-breast exams.  A clinical breast exam by your physician is

recommended on an annual basis.

This exam should not preclude additional follow-up of suspicious palpable abnormalities.



Note on Julieta scores and lifetime risk:

1. A Julieta score greater than 3% is considered moderate risk. If this is the case, consider

specialist referral to assess eligibility for a risk reducing agent.

2. If overall lifetime risk for the development of breast cancer is 20% or higher, the patient may

qualify for future screening with alternating mammogram and breast MRI.



Electronically signed and approved by: Javad Mazariegos DO

## 2024-01-23 ENCOUNTER — HOSPITAL ENCOUNTER (OUTPATIENT)
Dept: HOSPITAL 47 - ORWHC2ENDO | Age: 59
Discharge: HOME | End: 2024-01-23
Attending: SURGERY
Payer: COMMERCIAL

## 2024-01-23 VITALS — TEMPERATURE: 97.5 F

## 2024-01-23 VITALS — BODY MASS INDEX: 29.2 KG/M2

## 2024-01-23 VITALS — DIASTOLIC BLOOD PRESSURE: 85 MMHG | HEART RATE: 75 BPM | SYSTOLIC BLOOD PRESSURE: 144 MMHG

## 2024-01-23 VITALS — RESPIRATION RATE: 16 BRPM

## 2024-01-23 DIAGNOSIS — F17.200: ICD-10-CM

## 2024-01-23 DIAGNOSIS — M19.90: ICD-10-CM

## 2024-01-23 DIAGNOSIS — Z79.899: ICD-10-CM

## 2024-01-23 DIAGNOSIS — E11.9: ICD-10-CM

## 2024-01-23 DIAGNOSIS — K57.30: ICD-10-CM

## 2024-01-23 DIAGNOSIS — Z12.11: Primary | ICD-10-CM

## 2024-01-23 DIAGNOSIS — Z79.84: ICD-10-CM

## 2024-01-23 DIAGNOSIS — K21.9: ICD-10-CM

## 2024-01-23 DIAGNOSIS — M85.80: ICD-10-CM

## 2024-01-23 LAB
GLUCOSE BLD-MCNC: 104 MG/DL (ref 70–110)
GLUCOSE BLD-MCNC: 131 MG/DL (ref 70–110)

## 2024-01-23 PROCEDURE — 45378 DIAGNOSTIC COLONOSCOPY: CPT

## 2024-01-23 NOTE — P.PCN
Date of Procedure: 01/23/24


Procedure(s) Performed: 


PREOPERATIVE DIAGNOSIS: Colon cancer screening


POSTOPERATIVE DIAGNOSIS: Normal exam


PROCEDURE: Colonoscopy 


ANESTHESIA: MAC


SURGEON: Unruly Felix M.D.


SPECIMENS: None


ENDOSCOPIC PROCEDURE:  The patient was placed on the endoscopy table in the left

decubitus position.  The Olympus colonoscope was inserted into the anus and 

passed under direct visualization to the base of the cecum.  The appendiceal 

orifice was visualized.  From that point the scope was slowly withdrawn inspecti

ng all surfaces carefully.  There were no neoplastic inflammatory or polypoid 

lesions throughout the cecum, ascending, transverse, descending, sigmoid and 

rectum.  There was no visible diverticulosis noted.  Digital rectal examination 

was normal.  The patient was taken to the recovery room in stable condition per 

anesthesia guidelines.


RECOMMENDATIONS: Resume diet.  Repeat colonoscopy 10 years.

## 2024-01-23 NOTE — P.GSHP
History of Present Illness


H&P Date: 24


Chief Complaint: Colon cancer screening





58-year-old female here for colonoscopy.  L she has not had a colonoscopy 

previously.  No bowel complaints.  No family history of colon cancer.





Past Medical History


Past Medical History: Diabetes Mellitus, GERD/Reflux, Osteoarthritis (OA)


Additional Past Medical History / Comment(s): hiatal hernia, osteopenia. PAST 

GYN HISTORY: She has no history of STDs.  History of uterine fibroids.


History of Any Multi-Drug Resistant Organisms: None Reported


Past Surgical History: Bariatric Surgery,  Section, Joint Replacement, 

Orthopedic Surgery, Tubal Ligation


Additional Past Surgical History / Comment(s): lap band /later removed, gastric 

sleeve,  rt knee replacement, left knee arthroscopy x 2, rt shoulder 

arthroscopy, left elbow surgery for bone chip, LT TKA-


Past Anesthesia/Blood Transfusion Reactions: No Reported Reaction


Additional Past Anesthesia/Blood Transfusion Reaction / Comment(s): No blood 

transfusion to date


Smoking Status: Current every day smoker





- Past Family History


  ** Father


Family Medical History: Cancer, Pulmonary Embolus


Additional Family Medical History / Comment(s): throat cancer





  ** Mother


Family Medical History: Dementia


Additional Family Medical History / Comment(s): .  No family history of 

cancer of the breast, uterus, ovaries, or colon.





Medications and Allergies


                                Home Medications











 Medication  Instructions  Recorded  Confirmed  Type


 


ARIPiprazole [Abilify] 5 mg PO DAILY 10/23/14 01/17/24 History


 


Cholecalciferol (Vitamin D3) 125 mcg PO DAILY 20 History





[Vitamin D3 (5000 Iu)]    


 


Sertraline [Zoloft] 100 mg PO DAILY 20 History


 


metFORMIN  mg PO DAILY 23 History


 


Cyanocobalamin [Vitamin B-12] 500 mcg PO DAILY 24 History


 


Omeprazole 20 mg PO DAILY 24 History








                                    Allergies











Allergy/AdvReac Type Severity Reaction Status Date / Time


 


No Known Allergies Allergy   Verified 24 09:19














Surgical - Exam


                                   Vital Signs











Temp Pulse Resp BP Pulse Ox


 


 97.5 F L  78   18   144/85   97 


 


 24 09:22  24 09:22  24 09:22  24 09:22  24 09:22

















Physical exam:


General: Well-developed, well-nourished


HEENT: Normocephalic, sclerae nonicteric


Abdomen: Nontender, nondistended


Extremities: No edema


Neuro: Alert and oriented





Assessment and Plan


(1) Colon cancer screening


Narrative/Plan: 


Will proceed with colonoscopy at this time.


Current Visit: Yes   Status: Acute   Code(s): Z12.11 - ENCOUNTER FOR SCREENING 

FOR MALIGNANT NEOPLASM OF COLON   SNOMED Code(s): 689991553

## 2025-05-09 ENCOUNTER — HOSPITAL ENCOUNTER (OUTPATIENT)
Dept: HOSPITAL 47 - RADMAMWWP | Age: 60
Discharge: HOME | End: 2025-05-09
Attending: FAMILY MEDICINE
Payer: COMMERCIAL

## 2025-05-09 DIAGNOSIS — Z12.31: Primary | ICD-10-CM

## 2025-05-09 DIAGNOSIS — R92.333: ICD-10-CM

## 2025-05-09 DIAGNOSIS — Z78.0: ICD-10-CM

## 2025-05-09 PROCEDURE — 77067 SCR MAMMO BI INCL CAD: CPT
